# Patient Record
Sex: MALE | Race: WHITE | Employment: FULL TIME | URBAN - METROPOLITAN AREA
[De-identification: names, ages, dates, MRNs, and addresses within clinical notes are randomized per-mention and may not be internally consistent; named-entity substitution may affect disease eponyms.]

---

## 2017-12-18 ENCOUNTER — HOSPITAL ENCOUNTER (EMERGENCY)
Facility: HOSPITAL | Age: 47
Discharge: HOME | End: 2017-12-18
Attending: EMERGENCY MEDICINE
Payer: COMMERCIAL

## 2017-12-18 ENCOUNTER — APPOINTMENT (OUTPATIENT)
Dept: CT IMAGING | Facility: HOSPITAL | Age: 47
End: 2017-12-18
Attending: PHYSICIAN ASSISTANT
Payer: COMMERCIAL

## 2017-12-18 VITALS
SYSTOLIC BLOOD PRESSURE: 163 MMHG | RESPIRATION RATE: 17 BRPM | OXYGEN SATURATION: 97 % | TEMPERATURE: 97 F | HEART RATE: 79 BPM | DIASTOLIC BLOOD PRESSURE: 90 MMHG

## 2017-12-18 DIAGNOSIS — R10.9 ABDOMINAL PAIN OF UNKNOWN ETIOLOGY: Primary | ICD-10-CM

## 2017-12-18 PROCEDURE — 96374 THER/PROPH/DIAG INJ IV PUSH: CPT

## 2017-12-18 PROCEDURE — 96361 HYDRATE IV INFUSION ADD-ON: CPT

## 2017-12-18 PROCEDURE — 99284 EMERGENCY DEPT VISIT MOD MDM: CPT

## 2017-12-18 PROCEDURE — 80053 COMPREHEN METABOLIC PANEL: CPT | Performed by: PHYSICIAN ASSISTANT

## 2017-12-18 PROCEDURE — 74177 CT ABD & PELVIS W/CONTRAST: CPT | Performed by: PHYSICIAN ASSISTANT

## 2017-12-18 PROCEDURE — 85025 COMPLETE CBC W/AUTO DIFF WBC: CPT | Performed by: PHYSICIAN ASSISTANT

## 2017-12-18 PROCEDURE — 81003 URINALYSIS AUTO W/O SCOPE: CPT | Performed by: PHYSICIAN ASSISTANT

## 2017-12-18 RX ORDER — HYDROCODONE BITARTRATE AND ACETAMINOPHEN 5; 325 MG/1; MG/1
1 TABLET ORAL EVERY 4 HOURS PRN
Qty: 20 TABLET | Refills: 0 | Status: SHIPPED | OUTPATIENT
Start: 2017-12-18 | End: 2017-12-20

## 2017-12-18 RX ORDER — HYDROMORPHONE HYDROCHLORIDE 1 MG/ML
0.5 INJECTION, SOLUTION INTRAMUSCULAR; INTRAVENOUS; SUBCUTANEOUS ONCE
Status: COMPLETED | OUTPATIENT
Start: 2017-12-18 | End: 2017-12-18

## 2017-12-19 NOTE — ED PROVIDER NOTES
Patient Seen in: [facility] Emergency Department    History   Patient presents with:  Abdomen/Flank Pain (GI/)  Hip Pain    Stated Complaint:     HPI    CHIEF COMPLAINT: Right lower abdominal pain    HISTORY OF PRESENT ILLNESS: Patient is a 47-yea hematuria  Musculoskeletal: no back pain  Skin: no rashes  Neurological: no headache    Otherwise a complete review of systems was obtained and other than the HPI was negative     The patient's medication list, past medical history and social history eleme hypertrophy. no trismus or stridor. Uvula midline. No phonation changes, patient handling secretions well.  Mucous membranes moist.  Respiratory: there are no retractions, lungs are clear to auscultation  Cardiovascular: regular rate and rhythm, normal S1, 1446  ------------------------------------------------------------       MDM     Case was signed out to Dr. Td Lares at 2200           Disposition and Plan     Clinical Impression:  Abdominal pain of unknown etiology  (primary encounter diagnosis)    Dispos

## 2017-12-19 NOTE — ED INITIAL ASSESSMENT (HPI)
Pt to er with c.c. Rt hip pain after he bent over feels swollen and numb. Pt was able to walk but is having difficulty walking.

## 2017-12-19 NOTE — ED NOTES
Patient updated with plan of care. Awaiting urine specimen. Pt instructed to call when spec available. Urinal at bs.

## 2017-12-19 NOTE — ED PROVIDER NOTES
I reviewed that chart and discussed the case.   I have examined the patient and noted repeat abdominal exam to the patient's abdomen be soft nonsurgical he has reproducible tenderness along the anterior superior iliac spine on the right side is point tender

## 2017-12-20 ENCOUNTER — HOSPITAL ENCOUNTER (OUTPATIENT)
Facility: HOSPITAL | Age: 47
Setting detail: OBSERVATION
Discharge: HOME | End: 2017-12-24
Attending: EMERGENCY MEDICINE | Admitting: HOSPITALIST
Payer: COMMERCIAL

## 2017-12-20 ENCOUNTER — APPOINTMENT (OUTPATIENT)
Dept: MRI IMAGING | Facility: HOSPITAL | Age: 47
End: 2017-12-20
Attending: EMERGENCY MEDICINE
Payer: COMMERCIAL

## 2017-12-20 DIAGNOSIS — M54.59 INTRACTABLE LOW BACK PAIN: Primary | ICD-10-CM

## 2017-12-20 PROCEDURE — 72148 MRI LUMBAR SPINE W/O DYE: CPT | Performed by: EMERGENCY MEDICINE

## 2017-12-20 RX ORDER — MORPHINE SULFATE 4 MG/ML
4 INJECTION, SOLUTION INTRAMUSCULAR; INTRAVENOUS ONCE
Status: COMPLETED | OUTPATIENT
Start: 2017-12-20 | End: 2017-12-20

## 2017-12-20 RX ORDER — MORPHINE SULFATE 4 MG/ML
INJECTION, SOLUTION INTRAMUSCULAR; INTRAVENOUS
Status: COMPLETED
Start: 2017-12-20 | End: 2017-12-20

## 2017-12-21 RX ORDER — HYDROCODONE BITARTRATE AND ACETAMINOPHEN 10; 325 MG/1; MG/1
1-2 TABLET ORAL EVERY 4 HOURS PRN
Status: DISCONTINUED | OUTPATIENT
Start: 2017-12-21 | End: 2017-12-24

## 2017-12-21 RX ORDER — MORPHINE SULFATE 4 MG/ML
1 INJECTION, SOLUTION INTRAMUSCULAR; INTRAVENOUS EVERY 2 HOUR PRN
Status: DISCONTINUED | OUTPATIENT
Start: 2017-12-21 | End: 2017-12-24

## 2017-12-21 RX ORDER — PANTOPRAZOLE SODIUM 40 MG/1
40 TABLET, DELAYED RELEASE ORAL
Status: DISCONTINUED | OUTPATIENT
Start: 2017-12-21 | End: 2017-12-24

## 2017-12-21 RX ORDER — LISINOPRIL 10 MG/1
10 TABLET ORAL
Status: DISCONTINUED | OUTPATIENT
Start: 2017-12-21 | End: 2017-12-24

## 2017-12-21 RX ORDER — DOCUSATE SODIUM 100 MG/1
100 CAPSULE, LIQUID FILLED ORAL 2 TIMES DAILY
Status: DISCONTINUED | OUTPATIENT
Start: 2017-12-21 | End: 2017-12-24

## 2017-12-21 RX ORDER — CYCLOBENZAPRINE HCL 10 MG
10 TABLET ORAL 3 TIMES DAILY PRN
Status: DISCONTINUED | OUTPATIENT
Start: 2017-12-21 | End: 2017-12-24

## 2017-12-21 RX ORDER — DEXAMETHASONE SODIUM PHOSPHATE 4 MG/ML
4 VIAL (ML) INJECTION EVERY 6 HOURS
Status: DISCONTINUED | OUTPATIENT
Start: 2017-12-21 | End: 2017-12-24

## 2017-12-21 RX ORDER — LORAZEPAM 0.5 MG/1
0.5 TABLET ORAL EVERY 6 HOURS PRN
COMMUNITY
End: 2018-11-30

## 2017-12-21 RX ORDER — MONTELUKAST SODIUM 10 MG/1
10 TABLET ORAL
Status: DISCONTINUED | OUTPATIENT
Start: 2017-12-21 | End: 2017-12-24

## 2017-12-21 RX ORDER — ONDANSETRON 2 MG/ML
4 INJECTION INTRAMUSCULAR; INTRAVENOUS EVERY 6 HOURS PRN
Status: DISCONTINUED | OUTPATIENT
Start: 2017-12-21 | End: 2017-12-24

## 2017-12-21 RX ORDER — MORPHINE SULFATE 4 MG/ML
2 INJECTION, SOLUTION INTRAMUSCULAR; INTRAVENOUS EVERY 2 HOUR PRN
Status: DISCONTINUED | OUTPATIENT
Start: 2017-12-21 | End: 2017-12-24

## 2017-12-21 RX ORDER — FLUTICASONE PROPIONATE 50 MCG
1 SPRAY, SUSPENSION (ML) NASAL 2 TIMES DAILY
COMMUNITY
End: 2020-03-17

## 2017-12-21 RX ORDER — MORPHINE SULFATE 4 MG/ML
4 INJECTION, SOLUTION INTRAMUSCULAR; INTRAVENOUS EVERY 2 HOUR PRN
Status: DISCONTINUED | OUTPATIENT
Start: 2017-12-21 | End: 2017-12-24

## 2017-12-21 RX ORDER — LORAZEPAM 0.5 MG/1
0.5 TABLET ORAL EVERY 6 HOURS PRN
Status: DISCONTINUED | OUTPATIENT
Start: 2017-12-21 | End: 2017-12-24

## 2017-12-21 NOTE — CONSULTS
Fernando Potts Spearing Patient Status:  Observation    10/19/1970 MRN [id number]   [facility] 3SW-A Attending Dayton Lee MD   Hosp Day # 0 PCP Amaris Cee DO     Subjective: intact      Sensation : Decreased R L4L5    Intact in upper extremities in a dermatomal pattern  Nerve Tension signs :       Straight+ R  Spurling sign negative  Upper Motor Neuron signs:  No sustained ankle clonus bilateral  Babinski negative bilateral  H

## 2017-12-21 NOTE — PROGRESS NOTES
NURSING ADMISSION NOTE      Patient admitted via Cart  Oriented to room. Safety precautions initiated. Bed in low position. Call light in reach. Pt arrived to room 371 from emergency department. Pt A/O x 4, no distress noted.  Admission database c

## 2017-12-21 NOTE — ED INITIAL ASSESSMENT (HPI)
Pt returns to ED because his pain has gotten worse. Denies new trauma    Pain to anterior hip that radiates down groin and leg. Nazario Goring like his right leg is swollen. Pt had to have assistance w/ ambulation.      Pt was prescribed pain meds which aren't helpin

## 2017-12-21 NOTE — ED NOTES
Orders received for pain, see MAR. Report given to floor RN, transport paged. Pt awaiting transport to floor, wife is at bedside.

## 2017-12-21 NOTE — PROGRESS NOTES
Rosario RN called Pain clinic for new consult regarding pt's R L4/L5 TFESI. Answering service told RN that RN will have to call 12/22 am because office is now closed and they cannot take messages.

## 2017-12-21 NOTE — ED PROVIDER NOTES
Patient Seen in: [facility] Emergency Department    History   Patient presents with:  Pain (neurologic)    Stated Complaint: right anterior hip/groin pain and down his leg. leg feels swollen.  worsening lo*    HPI    60-year-old male presents emergen ED Triage Vitals  BP: 150/78 [12/20/17 2020]  Pulse: 111 [12/20/17 2020]  Resp: 26 [12/20/17 2020]  Temp: 98.5 °F (36.9 °C) [12/20/17 2020]  Temp src: Oral [12/21/17 0000]  SpO2: 100 % [12/20/17 2020]  O2 Device: None (Room air) [12/20/17 2020]    Dino significant impingement however patient is not able to do well or ambulate so feel he will not do well at home. See if we can get him to cool down with some pain medication possibly some steroids.   Did discuss case with the hospitalist told them they coul

## 2017-12-22 ENCOUNTER — APPOINTMENT (OUTPATIENT)
Dept: INTERVENTIONAL RADIOLOGY/VASCULAR | Facility: HOSPITAL | Age: 47
End: 2017-12-22
Attending: ANESTHESIOLOGY
Payer: COMMERCIAL

## 2017-12-22 ENCOUNTER — TELEPHONE (OUTPATIENT)
Dept: SURGERY | Facility: CLINIC | Age: 47
End: 2017-12-22

## 2017-12-22 PROCEDURE — 3E0S33Z INTRODUCTION OF ANTI-INFLAMMATORY INTO EPIDURAL SPACE, PERCUTANEOUS APPROACH: ICD-10-PCS | Performed by: ANESTHESIOLOGY

## 2017-12-22 PROCEDURE — 99243 OFF/OP CNSLTJ NEW/EST LOW 30: CPT | Performed by: NURSE PRACTITIONER

## 2017-12-22 PROCEDURE — 62323 NJX INTERLAMINAR LMBR/SAC: CPT | Performed by: ANESTHESIOLOGY

## 2017-12-22 PROCEDURE — 99152 MOD SED SAME PHYS/QHP 5/>YRS: CPT | Performed by: ANESTHESIOLOGY

## 2017-12-22 RX ORDER — POLYETHYLENE GLYCOL 3350 17 G/17G
17 POWDER, FOR SOLUTION ORAL DAILY
Status: DISCONTINUED | OUTPATIENT
Start: 2017-12-22 | End: 2017-12-24

## 2017-12-22 RX ORDER — METHYLPREDNISOLONE ACETATE 80 MG/ML
INJECTION, SUSPENSION INTRA-ARTICULAR; INTRALESIONAL; INTRAMUSCULAR; SOFT TISSUE
Status: COMPLETED
Start: 2017-12-22 | End: 2017-12-22

## 2017-12-22 RX ORDER — MIDAZOLAM HYDROCHLORIDE 1 MG/ML
INJECTION INTRAMUSCULAR; INTRAVENOUS
Status: COMPLETED
Start: 2017-12-22 | End: 2017-12-22

## 2017-12-22 RX ORDER — DOCUSATE SODIUM 100 MG/1
100 CAPSULE, LIQUID FILLED ORAL 2 TIMES DAILY
Status: DISCONTINUED | OUTPATIENT
Start: 2017-12-22 | End: 2017-12-24

## 2017-12-22 NOTE — TELEPHONE ENCOUNTER
Spoke to [employer] from [facility] regarding pain consult. Dr. Radha Salinas notified.

## 2017-12-22 NOTE — TELEPHONE ENCOUNTER
Shawna Delgado calling from [address] [place] [address] Neuro in re to a Consult with Pain Mgnt for back pain-call transferred to Pain Nurse

## 2017-12-22 NOTE — PROCEDURES
[facility]  Operative Report  2017     Brandt Hinds Patient Status:  Observation    10/19/1970 MRN [id number]   [facility] 3SW-A Attending Gail Conway MD   Hosp Day # 0 PCP Jean Giang DO     Indication: Sarah Warner i The needle was withdrawn with the stylet intact in situ. The needle's tip was intact. The patient tolerated the procedure very well without significant immediate complication. The patient's back was cleaned and sterile dressing was applied.   The patient

## 2017-12-22 NOTE — PROGRESS NOTES
[facility] Hospitalist Progress Note     PCP: Esthela Walls DO    Chief Complaint: follow-up    Overnight/Interim Events:      SUBJECTIVE:  Pt laying in bed. Just had ISRA[place]. Feels the same so fair. Trouble sitting in, doesn't feel can walk. On steriods/pain meds. **OR** morphINE sulfate **OR** morphINE sulfate, ondansetron HCl, LORazepam       Assessment/Plan:     56year old male with PMH sig for GERD, HTN, esophageal ca, who p/t [facility] ED c RLE pain.      RLE radicular pain  -reviewed MRI c pt and Dr. Marisela Velez

## 2017-12-22 NOTE — PHYSICAL THERAPY NOTE
PHYSICAL THERAPY EVALUATION - INPATIENT     Room Number: 371/371-A  Evaluation Date: 12/22/2017  Type of Evaluation: Initial  Physician Order: PT Eval and Treat    Presenting Problem: Intractable LBP s/p [facility] SERVICES 12/22  Reason for Therapy: Mobility Dysfunct Cognitive Status:  WFL - within functional limits  · Arousal/Alertness:  appropriate responses to stimuli  · Orientation Level:  oriented x4  · Following Commands:  follows multistep commands consistently  · Safety Judgement:  good awareness of safety prec (per FIM, actual CGA)  Distance (ft): 15  Assistive Device: Rolling walker  Pattern: R Decreased stance time;R Foot flat;Comment (mild kyphosis)  Stoop/Curb Assistance: Not tested       Skilled Therapy Provided: Pt presents to PT lying supine in bed c wife completed include The AM-PAC '6-Clicks' Inpatient Basic Mobility Short Form was completed and this patient is demonstrating a 61.29% degree of impairment in mobility. Research supports that patients with this level of impairment may benefit from OP PT.   Ba

## 2017-12-22 NOTE — TELEPHONE ENCOUNTER
[address] [place] [address] hospitalist for pt is Dr. Matilde Al. He can be paged by calling [phone].

## 2017-12-22 NOTE — CONSULTS
[facility]  Report of Consultation:  Pain Management Service    Jhon Alexander Patient Status:  Observation    10/19/1970 MRN [id number]   [facility] 3SW-A Attending Tyrone Pascual MD   Hosp Day # 0 PCP Esthela Walls DO     Da Age of Onset   • Pancreatic cancer [Other] [OTHER] Maternal Grandmother    • Cancer Maternal Aunt      unknown   • Breast Cancer Maternal Cousin Female       reports that he has never smoked.  He has never used smokeless tobacco. He reports that he drinks a No central canal stenosis. There is ligamentum flavum hypertrophy. There is bilateral neural foramina encroachment. L5-S1:  Small disk osteophyte complex. No central canal stenosis.       =====  CONCLUSION:    1.  Degenerative disc osteophyte complexes

## 2017-12-23 RX ORDER — HYDROCODONE BITARTRATE AND ACETAMINOPHEN 10; 325 MG/1; MG/1
1 TABLET ORAL EVERY 4 HOURS PRN
Qty: 25 TABLET | Refills: 1 | Status: SHIPPED | OUTPATIENT
Start: 2017-12-23 | End: 2018-01-02

## 2017-12-23 NOTE — PHYSICAL THERAPY NOTE
PHYSICAL THERAPY TREATMENT NOTE - INPATIENT    Room Number: 160/912-L     Session:  Number of Visits to Meet Established Goals: 2    Presenting Problem: Intractable LBP s/p [facility] SERVICES 12/22    Problem List  Principal Problem:    Intractable low back pain      Pa A Little   How much help from another person does the patient currently need. ..   -   Moving to and from a bed to a chair (including a wheelchair)?: A Little   -   Need to walk in hospital room?: A Little   -   Climbing 3-5 steps with a railing?: A Lot safe to return home today as pt was unable to do any stairs. Pt needs to be able to negotiate 4 stairs to be able to get into the house;RN aware.       DISCHARGE RECOMMENDATIONS  PT Discharge Recommendations: Home;Outpatient PT     PLAN  PT Treatment Plan:

## 2017-12-23 NOTE — PROGRESS NOTES
[facility] Hospitalist Progress Note     PCP: Tammy Stone DO    Chief Complaint: follow-up    Overnight/Interim Events:      SUBJECTIVE:  Pt laying in bed. Pain 7/10. Did sit up c therapy. Feels pulling in leg pain sensation.      OBJECTIVE:  Temp:  [97.7 °F Cyclobenzaprine HCl, HYDROcodone-acetaminophen, morphINE sulfate **OR** morphINE sulfate **OR** morphINE sulfate, ondansetron HCl, LORazepam       Assessment/Plan:     56year old male with PMH sig for GERD, HTN, esophageal ca, who p/t [facility] ED c RLE pain.

## 2017-12-24 VITALS
TEMPERATURE: 98 F | WEIGHT: 310 LBS | RESPIRATION RATE: 18 BRPM | OXYGEN SATURATION: 99 % | BODY MASS INDEX: 35.87 KG/M2 | DIASTOLIC BLOOD PRESSURE: 82 MMHG | HEART RATE: 80 BPM | HEIGHT: 78 IN | SYSTOLIC BLOOD PRESSURE: 138 MMHG

## 2017-12-24 RX ORDER — POLYETHYLENE GLYCOL 3350 17 G/17G
17 POWDER, FOR SOLUTION ORAL DAILY
Status: DISCONTINUED | OUTPATIENT
Start: 2017-12-24 | End: 2017-12-24

## 2017-12-24 NOTE — DISCHARGE SUMMARY
[facility] Internal Medicine Discharge Summary   Patient ID:  Alexandro Fernández  [id number]  6 year old  10/19/1970    Admit date: 12/20/2017    Discharge date and time: 12/24/2017     Attending Physician: Adam Valle MD     Primary Care Physician: Ailyn Templeton and flexeril at home, cont; norco given  -hold asa     HTN  -ace     GERD  -PPI, wean as o/p if appropriate     Esophageal ca  -per pt, doing well, follows c Dr. Igor Hanson     Constipation  -bowel regimen, give meds before dc to help     -on singular     Proph for each of these medications  · HYDROcodone-acetaminophen  MG Tabs         Consults: IP CONSULT TO HOSPITALIST  NURSING CONSULT TO DIETITIAN  IP CONSULT TO ORTHO SPINE  IP CONSULT TO PAIN MANAGEMENT  IP CONSULT TO PAIN MANAGEMENT    Radiology: Ray Mckeon ABDOMEN+PELVIS(CONTRAST ONLY)(CPT=[id number]  COMPARISON:  None.   INDICATIONS:  lower abd pain, r/o hernia  TECHNIQUE:  CT scanning was performed from the dome of the diaphragm to the pubic symphysis with non-ionic intravenous contrast material. Post contrast c diverticulosis.     Dictated by: Nik Samuels MD on 12/18/2017 at 22:37     Approved by: Nik Samuels MD            Ir Pain Management Procedure    Result Date: 12/22/2017  PROCEDURE:  IR PAIN MANAGEMENT PROCEDURE  INDICATIONS:  Pain      CONCLUSION:  F

## 2017-12-24 NOTE — PROGRESS NOTES
Carla Lubin was under my care at [facility] from 12/20/2017 through 12/24/2017. Please excuse him from work through 12/26/17. Please be restricted from any lifting until improved with PT. Limit stairs to <5.     Johnna Kay MD  [facility] Hospitalist

## 2017-12-24 NOTE — PLAN OF CARE
NURSING DISCHARGE NOTE    Discharged Home via Wheelchair. Accompanied by Family member and Support staff  Belongings Taken by patient/family. Patient given discharge paperwork and script. Verbalizes understanding.

## 2017-12-24 NOTE — PHYSICAL THERAPY NOTE
PHYSICAL THERAPY TREATMENT NOTE - INPATIENT    Room Number: 511/425-G     Session:   Number of Visits to Meet Established Goals: 2    Presenting Problem: Intractable LBP s/p [facility] SERVICES 12/22    Problem List  Principal Problem:    Intractable low back pain      P back to sitting on the side of the bed?: None   How much help from another person does the patient currently need. ..   -   Moving to and from a bed to a chair (including a wheelchair)?: A Little   -   Need to walk in hospital room?: [address] cga/SBA, was able to go up/down small curb with the RW cga/SBA provided. Pt is safe to return home with family/wife to assist as needed.   Emphasized to pt that he is not allowed to lift at this time & will need to use the RW for mobility; recommend outpt

## 2018-01-04 PROBLEM — M54.16 LUMBAR RADICULOPATHY: Status: ACTIVE | Noted: 2018-01-04

## 2018-02-09 PROBLEM — M51.36 DDD (DEGENERATIVE DISC DISEASE), LUMBAR: Status: ACTIVE | Noted: 2018-02-09

## 2018-02-09 PROBLEM — M51.369 DDD (DEGENERATIVE DISC DISEASE), LUMBAR: Status: ACTIVE | Noted: 2018-02-09

## 2018-02-09 PROBLEM — M54.16 LUMBAR RADICULITIS: Status: ACTIVE | Noted: 2018-02-09

## 2018-11-30 PROBLEM — M54.16 LUMBAR RADICULITIS: Status: RESOLVED | Noted: 2018-02-09 | Resolved: 2018-11-30

## 2018-11-30 PROBLEM — M54.59 INTRACTABLE LOW BACK PAIN: Status: RESOLVED | Noted: 2017-12-20 | Resolved: 2018-11-30

## 2018-11-30 PROBLEM — M54.16 LUMBAR RADICULOPATHY: Status: RESOLVED | Noted: 2018-01-04 | Resolved: 2018-11-30

## 2022-02-02 PROBLEM — E03.9 HYPOTHYROIDISM, UNSPECIFIED TYPE: Status: ACTIVE | Noted: 2022-02-02

## 2022-02-17 NOTE — LETTER
Consent to Procedure/Sedation    Date: 12/22/2017    Time: _______________    1. I authorize the performance upon Dorris Galeazzi the following:    Epidural Steroid Injection    2.  I authorize Dr. Grace Brink whomever is designated as the doctor’s assistant), Witness: ____________________     Date: ______________    Printed: 2017   10:54 AM    Patient Name: Serge Albright        : 10/19/1970       Medical Record #: [id number] Develop coping skills.  For example, strategies and lifestyle changes that reduce negative moods, stress, and exposure to smoking cues.

## 2024-02-21 ENCOUNTER — HOSPITAL ENCOUNTER (EMERGENCY)
Facility: HOSPITAL | Age: 54
Discharge: HOME | End: 2024-02-21
Attending: EMERGENCY MEDICINE
Payer: COMMERCIAL

## 2024-02-21 ENCOUNTER — APPOINTMENT (OUTPATIENT)
Dept: CT IMAGING | Facility: HOSPITAL | Age: 54
End: 2024-02-21
Attending: EMERGENCY MEDICINE
Payer: COMMERCIAL

## 2024-02-21 ENCOUNTER — APPOINTMENT (OUTPATIENT)
Dept: GENERAL RADIOLOGY | Facility: HOSPITAL | Age: 54
End: 2024-02-21
Attending: EMERGENCY MEDICINE
Payer: COMMERCIAL

## 2024-02-21 VITALS
DIASTOLIC BLOOD PRESSURE: 98 MMHG | OXYGEN SATURATION: 100 % | HEIGHT: 77 IN | HEART RATE: 91 BPM | TEMPERATURE: 98 F | SYSTOLIC BLOOD PRESSURE: 134 MMHG | WEIGHT: 315 LBS | RESPIRATION RATE: 21 BRPM | BODY MASS INDEX: 37.19 KG/M2

## 2024-02-21 DIAGNOSIS — R10.11 ABDOMINAL PAIN, RIGHT UPPER QUADRANT: Primary | ICD-10-CM

## 2024-02-21 DIAGNOSIS — K59.00 CONSTIPATION, UNSPECIFIED CONSTIPATION TYPE: ICD-10-CM

## 2024-02-21 LAB
ALBUMIN SERPL-MCNC: 4.4 G/DL (ref 3.4–5)
ALBUMIN/GLOB SERPL: 1.2 {RATIO} (ref 1–2)
ALP LIVER SERPL-CCNC: 74 U/L
ALT SERPL-CCNC: 22 U/L
ANION GAP SERPL CALC-SCNC: 5 MMOL/L (ref 0–18)
AST SERPL-CCNC: 18 U/L (ref 15–37)
BASOPHILS # BLD AUTO: 0.06 X10(3) UL (ref 0–0.2)
BASOPHILS NFR BLD AUTO: 0.9 %
BILIRUB SERPL-MCNC: 0.7 MG/DL (ref 0.1–2)
BILIRUB UR QL STRIP.AUTO: NEGATIVE
BUN BLD-MCNC: 14 MG/DL (ref 9–23)
CALCIUM BLD-MCNC: 9.5 MG/DL (ref 8.5–10.1)
CHLORIDE SERPL-SCNC: 104 MMOL/L (ref 98–112)
CLARITY UR REFRACT.AUTO: CLEAR
CO2 SERPL-SCNC: 28 MMOL/L (ref 21–32)
CREAT BLD-MCNC: 1.14 MG/DL
D DIMER PPP FEU-MCNC: <0.27 UG/ML FEU (ref ?–0.52)
EGFRCR SERPLBLD CKD-EPI 2021: 77 ML/MIN/1.73M2 (ref 60–?)
EOSINOPHIL # BLD AUTO: 0.26 X10(3) UL (ref 0–0.7)
EOSINOPHIL NFR BLD AUTO: 3.8 %
ERYTHROCYTE [DISTWIDTH] IN BLOOD BY AUTOMATED COUNT: 12.9 %
GLOBULIN PLAS-MCNC: 3.7 G/DL (ref 2.8–4.4)
GLUCOSE BLD-MCNC: 116 MG/DL (ref 70–99)
GLUCOSE UR STRIP.AUTO-MCNC: NORMAL MG/DL
HCT VFR BLD AUTO: 45.1 %
HGB BLD-MCNC: 15.2 G/DL
IMM GRANULOCYTES # BLD AUTO: 0.02 X10(3) UL (ref 0–1)
IMM GRANULOCYTES NFR BLD: 0.3 %
KETONES UR STRIP.AUTO-MCNC: NEGATIVE MG/DL
LEUKOCYTE ESTERASE UR QL STRIP.AUTO: NEGATIVE
LIPASE SERPL-CCNC: 36 U/L (ref 13–75)
LYMPHOCYTES # BLD AUTO: 1.52 X10(3) UL (ref 1–4)
LYMPHOCYTES NFR BLD AUTO: 22.2 %
MCH RBC QN AUTO: 28.6 PG (ref 26–34)
MCHC RBC AUTO-ENTMCNC: 33.7 G/DL (ref 31–37)
MCV RBC AUTO: 84.9 FL
MONOCYTES # BLD AUTO: 0.67 X10(3) UL (ref 0.1–1)
MONOCYTES NFR BLD AUTO: 9.8 %
NEUTROPHILS # BLD AUTO: 4.32 X10 (3) UL (ref 1.5–7.7)
NEUTROPHILS # BLD AUTO: 4.32 X10(3) UL (ref 1.5–7.7)
NEUTROPHILS NFR BLD AUTO: 63 %
NITRITE UR QL STRIP.AUTO: NEGATIVE
NT-PROBNP SERPL-MCNC: 27 PG/ML (ref ?–125)
OSMOLALITY SERPL CALC.SUM OF ELEC: 285 MOSM/KG (ref 275–295)
PH UR STRIP.AUTO: 5.5 [PH] (ref 5–8)
PLATELET # BLD AUTO: 297 10(3)UL (ref 150–450)
POTASSIUM SERPL-SCNC: 3.9 MMOL/L (ref 3.5–5.1)
PROT SERPL-MCNC: 8.1 G/DL (ref 6.4–8.2)
PROT UR STRIP.AUTO-MCNC: NEGATIVE MG/DL
RBC # BLD AUTO: 5.31 X10(6)UL
RBC UR QL AUTO: NEGATIVE
SODIUM SERPL-SCNC: 137 MMOL/L (ref 136–145)
SP GR UR STRIP.AUTO: 1.01 (ref 1–1.03)
TROPONIN I SERPL HS-MCNC: 5 NG/L
UROBILINOGEN UR STRIP.AUTO-MCNC: NORMAL MG/DL
WBC # BLD AUTO: 6.9 X10(3) UL (ref 4–11)

## 2024-02-21 PROCEDURE — 85025 COMPLETE CBC W/AUTO DIFF WBC: CPT

## 2024-02-21 PROCEDURE — 83690 ASSAY OF LIPASE: CPT

## 2024-02-21 PROCEDURE — 36415 COLL VENOUS BLD VENIPUNCTURE: CPT

## 2024-02-21 PROCEDURE — 85025 COMPLETE CBC W/AUTO DIFF WBC: CPT | Performed by: EMERGENCY MEDICINE

## 2024-02-21 PROCEDURE — 85379 FIBRIN DEGRADATION QUANT: CPT | Performed by: EMERGENCY MEDICINE

## 2024-02-21 PROCEDURE — 80053 COMPREHEN METABOLIC PANEL: CPT

## 2024-02-21 PROCEDURE — 81003 URINALYSIS AUTO W/O SCOPE: CPT | Performed by: EMERGENCY MEDICINE

## 2024-02-21 PROCEDURE — 99285 EMERGENCY DEPT VISIT HI MDM: CPT

## 2024-02-21 PROCEDURE — 93010 ELECTROCARDIOGRAM REPORT: CPT

## 2024-02-21 PROCEDURE — 83880 ASSAY OF NATRIURETIC PEPTIDE: CPT | Performed by: EMERGENCY MEDICINE

## 2024-02-21 PROCEDURE — 84484 ASSAY OF TROPONIN QUANT: CPT | Performed by: EMERGENCY MEDICINE

## 2024-02-21 PROCEDURE — 93005 ELECTROCARDIOGRAM TRACING: CPT

## 2024-02-21 PROCEDURE — 83690 ASSAY OF LIPASE: CPT | Performed by: EMERGENCY MEDICINE

## 2024-02-21 PROCEDURE — 80053 COMPREHEN METABOLIC PANEL: CPT | Performed by: EMERGENCY MEDICINE

## 2024-02-21 PROCEDURE — 71045 X-RAY EXAM CHEST 1 VIEW: CPT | Performed by: EMERGENCY MEDICINE

## 2024-02-21 PROCEDURE — 74177 CT ABD & PELVIS W/CONTRAST: CPT | Performed by: EMERGENCY MEDICINE

## 2024-02-21 RX ORDER — LISINOPRIL AND HYDROCHLOROTHIAZIDE 12.5; 1 MG/1; MG/1
1 TABLET ORAL DAILY
COMMUNITY

## 2024-02-21 NOTE — ED INITIAL ASSESSMENT (HPI)
Pt ambulatory to ED c/o RUQ pain for about a week, reports hasn't had a BM for a couple weeks, denies N/V/fevers. Reports feels like a pressure, intermittent 5/10 pain.

## 2024-02-21 NOTE — ED PROVIDER NOTES
Patient Seen in: [facility] Emergency Department      History     Chief Complaint   Patient presents with    Abdomen/Flank Pain     Stated Complaint: Pt presents ambulatory to ED for RUQ pain. Pt also states he feels bloated and *    Subjective:   HPI    This is a 53-year-old male presents emergency room for evaluation of upper quadrant abdominal pain, states symptoms have been intermittent over the last 3 to 4 days, noticed symptoms seem to occur after eating.  Patient has been feeling bloated as well.  Patient reports he has been feeling constipated last 3 to 4 weeks, has been able to move the bowels only small amount at a time.  States he has intermittent crampy abdominal pain.  Denies any tearing type chest or abdominal pain.  Denies lower extremity swelling or calf pain.  Denies PND orthopnea.  Denies chest pain or shortness of breath.    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)     esophogeal cancer    Obese 10/29/2011    Testicular torsion 1996    surgery              Past Surgical History:   Procedure Laterality Date    HC FINE NEEDLE ASPIRATION BIOPSY WO IMAGING 1ST LESION  2015    LARYNGOSCOPY,DIRCT,OP,BIOPSY  2015    REMOVAL OF TUNNELED CVA DEVICE, WITH SUBQ PORT OR PUMP, CENTRAL OR PERIPHERAL INSERTION N/A 8/25/2015    Procedure: PORT-A-CATH REMOVAL;  Surgeon: Dorian Lozada MD;  [facility]    TONSILLECTOMY                  Social History     Socioeconomic History    Marital status:    Tobacco Use    Smoking status: Never    Smokeless tobacco: Never   Substance and Sexual Activity    Alcohol use: Yes     Alcohol/week: 0.0 standard drinks of alcohol     Comment: weekends    Drug use: No              Review of Systems    Positive for stated complaint: Pt presents ambulatory to ED for RUQ pain. Pt also states he feels bloated  and *  Other systems are as noted in HPI.  Constitutional and vital signs reviewed.      All other systems reviewed and negative except as noted above.    Physical Exam     ED Triage Vitals   BP 02/21/24 0801 (!) 152/102   Pulse 02/21/24 0800 105   Resp 02/21/24 0800 18   Temp 02/21/24 0800 98.3 °F (36.8 °C)   Temp src 02/21/24 0800 Temporal   SpO2 02/21/24 0800 95 %   O2 Device 02/21/24 0800 None (Room air)       Current:BP (!) 134/98   Pulse 91   Temp 98.3 °F (36.8 °C) (Temporal)   Resp 21   Ht 195.6 cm (6' 5\")   Wt (!) 151.5 kg   SpO2 100%   BMI 39.61 kg/m²         Physical Exam    GENERAL: Patient is awake, alert, well-appearing, in no acute distress.  HEENT: no scleral icterus.  Mucous membranes are moist,  NECK: Neck is supple, there is no nuchal rigidity.    HEART: Regular rate and rhythm, no murmurs.  LUNGS: Clear to auscultation bilaterally.  No Rales, no rhonchi, no wheezing, no stridor.  ABDOMEN: Soft, nondistended, mild right upper quadrant tender, negative Edmonds sign, bowel sounds are present, no rebound, no rigidity, no guarding.no pulsatile masses. No CVA tenderness  EXTREMITIES: No peripheral edema, no calf tenderness,    ED Course     Labs Reviewed   COMP METABOLIC PANEL (14) - Abnormal; Notable for the following components:       Result Value    Glucose 116 (*)     All other components within normal limits   LIPASE - Normal   TROPONIN I HIGH SENSITIVITY - Normal   D-DIMER - Normal   PRO BETA NATRIURETIC PEPTIDE - Normal   CBC WITH DIFFERENTIAL WITH PLATELET    Narrative:     The following orders were created for panel order CBC With Differential With Platelet.  Procedure                               Abnormality         Status                     ---------                               -----------         ------                     CBC W/ DIFFERENTIAL[[id number]]                              Final result                 Please view results for these tests on the individual orders.    URINALYSIS, ROUTINE   CBC W/ DIFFERENTIAL     EKG    Rate, intervals and axes as noted on EKG Report.  Rate: 91  Rhythm: Sinus Rhythm  Reading: Normal sinus rhythm.  No ST elevation.                          MDM        Differential diagnosis before testing includes but not limited to cholelithiasis/cholecystitis, pancreatitis, appendicitis, bowel obstruction, electrolyte abnormality, constipation, which is a medical condition that poses a threat to life/function    Radiographic images  I personally reviewed the radiographs and my individual interpretation shows chest x-ray no pneumothorax  I also reviewed the official reports that showed CT abdomen no acute abnormality in the abdomen and pelvis, chest x-ray normal cardiac and mediastinal contours.      Course of Events during Emergency Room Visit include IV established blood work obtained.  CBC and chemistry unremarkable, lipase 36.  Troponin was 5.  D-dimer was 0.27.  CT of the abdomen performed, no acute findings, reevaluation denies any abdominal pain abdomen soft nontender nonsurgical on reevaluation.  Patient presents with intermittent right upper quad abdominal pain as well as constipation.  Etiology of patient's symptoms are unclear, discussed possibility of biliary dyskinesia and recommend further evaluation with GI as outpatient possible HIDA scan.  Patient also was given prescription for magnesium citrate as he has been constipated.  Instructed to stay well-hydrated, follow-up with primary care physician as well.  Return to ER if any change or worsening symptoms.  Patient well-appearing agrees plan discharge good condition.  Vital signs are stable.    Shared decision making was utilized           Disposition:    Discharge  I have discussed with the patient the results of test, differential diagnosis, treatment plan, warning signs and symptoms which should prompt immediate return.  They expressed understanding of these instructions and agrees to the  following plan provided.  They were given written discharge instructions and agrees to return for any concerns and voiced understanding and all questions were answered.    Note to patient: The 21st Century Cures Act makes medical notes like these available to patients in the interest of transparency. However, this is a medical document intended as peer to peer communication. It is written in medical language and may contain abbreviations or verbiage that are unfamiliar. It may appear blunt or direct. Medical documents are intended to carry relevant information, facts as evident, and the clinical opinion of the practitioner.                                            Medical Decision Making      Disposition and Plan     Clinical Impression:  1. Abdominal pain, right upper quadrant    2. Constipation, unspecified constipation type         Disposition:  Discharge  2/21/2024 11:32 am    Follow-up:  Hieu Denise DO  [address] [place] [address] [phone]    Follow up in 2 day(s)      Alverto Colon MD  [address]  [facility] [address]  [facility] [address] [phone]    Call in 2 day(s)            Medications Prescribed:  Discharge Medication List as of 2/21/2024 11:33 AM        START taking these medications    Details   Magnesium Citrate Oral Solution Take 296 mL by mouth once for 1 dose., Normal, Disp-296 mL, R-0

## 2024-02-22 LAB
ATRIAL RATE: 91 BPM
P AXIS: 55 DEGREES
P-R INTERVAL: 218 MS
Q-T INTERVAL: 336 MS
QRS DURATION: 80 MS
QTC CALCULATION (BEZET): 413 MS
R AXIS: 50 DEGREES
T AXIS: 49 DEGREES
VENTRICULAR RATE: 91 BPM

## 2025-07-25 PROCEDURE — 99282 EMERGENCY DEPT VISIT SF MDM: CPT

## 2025-07-25 PROCEDURE — 99283 EMERGENCY DEPT VISIT LOW MDM: CPT

## 2025-07-26 ENCOUNTER — HOSPITAL ENCOUNTER (EMERGENCY)
Facility: HOSPITAL | Age: 55
Discharge: HOME | End: 2025-07-26
Attending: EMERGENCY MEDICINE

## 2025-07-26 VITALS
HEIGHT: 77 IN | BODY MASS INDEX: 36.6 KG/M2 | WEIGHT: 310 LBS | TEMPERATURE: 97 F | SYSTOLIC BLOOD PRESSURE: 131 MMHG | HEART RATE: 75 BPM | OXYGEN SATURATION: 98 % | DIASTOLIC BLOOD PRESSURE: 87 MMHG | RESPIRATION RATE: 18 BRPM

## 2025-07-26 DIAGNOSIS — Z71.1 FEARED CONDITION NOT DEMONSTRATED: ICD-10-CM

## 2025-07-26 DIAGNOSIS — H92.02 LEFT EAR PAIN: Primary | ICD-10-CM

## 2025-07-26 NOTE — ED PROVIDER NOTES
Patient Seen in: [facility] Emergency Department        History  Chief Complaint   Patient presents with    FB in Ear     Stated Complaint: Left Ear Foreign Body Q-Tip    Subjective:   54-year-old male presents with suspicion of foreign body in the left ear canal.  States he cleaned his ear with a Q-tip and we took it out the head of the Q-tip was missing.  Wife put hydroperoxide in the ear canal hoping it would make the cotton enlarged and she could grab it but nothing happened today came in for evaluation.  Seen by ENT this week for throat pain                      Objective:     Past Medical History:    COVID    GERD (gastroesophageal reflux disease)    Hypertension    Malignant neoplasm of base of tongue (HCC)    esophogeal cancer    Obese    Testicular torsion    surgery              Past Surgical History:   Procedure Laterality Date    Hc fine needle aspiration biopsy wo imaging 1st lesion  2015    Laryngoscopy,dirct,op,biopsy  2015    Removal of tunneled cva device, with subq port or pump, central or peripheral insertion N/A 8/25/2015    Procedure: PORT-A-CATH REMOVAL;  Surgeon: Dorian Lozada MD;  Location: [facility]    Tonsillectomy                  Social History     Socioeconomic History    Marital status:    Tobacco Use    Smoking status: Never    Smokeless tobacco: Never   Vaping Use    Vaping status: Never Used   Substance and Sexual Activity    Alcohol use: Yes     Alcohol/week: 0.0 standard drinks of alcohol     Comment: weekends    Drug use: No                                Physical Exam    ED Triage Vitals [07/25/25 2316]   /86   Pulse 85   Resp 18   Temp 96.8 °F (36 °C)   Temp src Temporal   SpO2 98 %   O2 Device None (Room air)       Current Vitals:   Vital Signs  BP: 131/87  Pulse: 75  Resp: 18  Temp: 96.8 °F (36 °C)  Temp src: Temporal  MAP (mmHg): (!) 101    Oxygen Therapy  SpO2: 98 %  O2 Device: None (Room air)            Physical Exam  Vitals and nursing  note reviewed.   Constitutional:       Appearance: He is not toxic-appearing.   HENT:      Head: Normocephalic.      Right Ear: Tympanic membrane, ear canal and external ear normal. There is no impacted cerumen.      Left Ear: Tympanic membrane, ear canal and external ear normal. There is no impacted cerumen.      Nose: Nose normal.   Cardiovascular:      Rate and Rhythm: Normal rate.   Pulmonary:      Effort: Pulmonary effort is normal.   Musculoskeletal:      Cervical back: Neck supple.   Skin:     General: Skin is warm and dry.   Neurological:      General: No focal deficit present.      Mental Status: He is alert and oriented to person, place, and time.      Cranial Nerves: No cranial nerve deficit.   Psychiatric:         Mood and Affect: Mood normal.         Behavior: Behavior normal.                 ED Course  Labs Reviewed - No data to display                         MDM     External chart review demonstrates outpatient ENT visit 3 days ago     wife at bedside helpful to provide information on the history presenting illness    Differential diagnosis includes but not limited to, foreign body, ruptured eardrum    54-year-old male presents with suspicion of foreign body in the left ear canal, the head of a Q-tip.  It is not there.  Had another physician look as well and there is no foreign body in the ear canal.  Can visualize entire eardrum.  No perforation.  Tiny amount of dried wax.  No foreign body.  Well-appearing, discharge home, outpatient follow-up ENT as needed, shared decision made utilized, return precaution provided.  Wife and patient in agreement    Patient was screened and evaluated during this visit.  As the treating physician attending to the patient, I determined within reasonable clinical confidence and prior to discharge, that an emergency medical condition was not or was no longer present.  There was no indication for further evaluation, treatment, or admission on an emergency basis.   Comprehensive verbal and written discharge and follow-up instructions were provided to help prevent relapse or worsening.  Patient was instructed to follow-up with their primary care provider for further evaluation and treatment, return immediately to ER for worsening, concerning, new, or changing/persisting symptoms. I discussed the case with the patient and they had no questions, complaints, or concerns.  Patient was comfortable going home.     Per the discharge paperwork, patients are encouraged to and given instructions on how to sign up for MyChart, where they have access to their records, including any/all incidental findings.     This note was prepared using Dragon Medical voice recognition dictation software. As a result errors may occur. When identified these errors have been corrected. While every attempt is made to correct errors during dictation discrepancies may still exist    Note to patient: The 21st Century Cures Act makes medical notes like these available to patients in the interest of transparency. However, this is a medical document intended as peer to peer communication. It is written in medical language and may contain abbreviations or verbiage that are unfamiliar. It may appear blunt or direct. Medical documents are intended to carry relevant information, facts as evident, and the clinical opinion of the practitioner.               Medical Decision Making      Disposition and Plan     Clinical Impression:  1. Left ear pain    2. Feared condition not demonstrated         Disposition:  Discharge  7/26/2025 12:39 am    Follow-up:  Winston Ba MD  [address]  [address] [place] [address] [id number] [phone]    Follow up  As needed          Medications Prescribed:  Current Discharge Medication List                Supplementary Documentation:

## 2025-07-26 NOTE — ED INITIAL ASSESSMENT (HPI)
Pt arrives ambulatory, tip of Q tip stuck in left ear. Tried flushing with no success. A&O x4   no concerns

## (undated) NOTE — ED AVS SNAPSHOT
Jhon Alexander   MRN: [id number]    Department:  [facility] Emergency Department   Date of Visit:  12/18/2017           Disclosure     Insurance plans vary and the physician(s) referred by the ER may not be covered by your plan.  Please contact y tell this physician (or your personal doctor if your instructions are to return to your personal doctor) about any new or lasting problems. The primary care or specialist physician will see patients referred from the [facility] Emergency Department.  Bella Nino

## (undated) NOTE — LETTER
Consent to Procedure/Sedation    Date: __________________    Time: _______________    1. I authorize the performance upon Shade Cousin the following:    Epidural Steroid Injection    2.  I authorize  (and whomever is designated as the doctor’s as Witness: ____________________     Date: ______________    Printed: 2017   11:48 AM    Patient Name: Estefany Garduno        : 10/19/1970       Medical Record #: [id number]